# Patient Record
Sex: MALE | Race: OTHER | NOT HISPANIC OR LATINO | URBAN - METROPOLITAN AREA
[De-identification: names, ages, dates, MRNs, and addresses within clinical notes are randomized per-mention and may not be internally consistent; named-entity substitution may affect disease eponyms.]

---

## 2017-04-12 ENCOUNTER — EMERGENCY (EMERGENCY)
Facility: HOSPITAL | Age: 37
LOS: 1 days | Discharge: HOME | End: 2017-04-12
Attending: EMERGENCY MEDICINE | Admitting: EMERGENCY MEDICINE
Payer: SELF-PAY

## 2017-04-12 VITALS
TEMPERATURE: 98 F | OXYGEN SATURATION: 99 % | RESPIRATION RATE: 18 BRPM | DIASTOLIC BLOOD PRESSURE: 103 MMHG | SYSTOLIC BLOOD PRESSURE: 150 MMHG | HEART RATE: 77 BPM

## 2017-04-12 DIAGNOSIS — H57.8 OTHER SPECIFIED DISORDERS OF EYE AND ADNEXA: ICD-10-CM

## 2017-04-12 DIAGNOSIS — B97.89 OTHER VIRAL AGENTS AS THE CAUSE OF DISEASES CLASSIFIED ELSEWHERE: ICD-10-CM

## 2017-04-12 DIAGNOSIS — H10.32 UNSPECIFIED ACUTE CONJUNCTIVITIS, LEFT EYE: ICD-10-CM

## 2017-04-12 DIAGNOSIS — J06.9 ACUTE UPPER RESPIRATORY INFECTION, UNSPECIFIED: ICD-10-CM

## 2017-04-12 PROCEDURE — 99283 EMERGENCY DEPT VISIT LOW MDM: CPT

## 2017-04-12 RX ORDER — ERYTHROMYCIN BASE 5 MG/GRAM
1 OINTMENT (GRAM) OPHTHALMIC (EYE) ONCE
Qty: 0 | Refills: 0 | Status: DISCONTINUED | OUTPATIENT
Start: 2017-04-12 | End: 2017-04-16

## 2017-04-12 NOTE — ED PROVIDER NOTE - CARE PLAN
Principal Discharge DX:	Acute conjunctivitis of left eye, unspecified acute conjunctivitis type Principal Discharge DX:	Acute conjunctivitis of left eye, unspecified acute conjunctivitis type  Secondary Diagnosis:	Viral URI

## 2017-04-12 NOTE — ED PROVIDER NOTE - ENMT NEGATIVE STATEMENT, MLM
Ears: no ear pain and no hearing problems.Nose: nasal congestion and no nasal drainage.Mouth/Throat: no dysphagia, no hoarseness and no throat pain.Neck: no lumps, no pain, no stiffness and no swollen glands.

## 2017-04-12 NOTE — ED PROVIDER NOTE - ATTENDING CONTRIBUTION TO CARE
I, Dr. Lenny Art, interviewed the patient and performed a physical exam and spoke to the resident about the plan of care for this patient.  Pt with left eye reddness, itchiness, assoc with cold sxs.  Appears well, nontoxic, no drainage currently, EOMI.  likely viral conj, cannot exclude bacterial, claritin OTC and anibx ointment.

## 2017-04-12 NOTE — ED ADULT NURSE NOTE - OBJECTIVE STATEMENT
36y m pt c/o burning, itching, tearing, redness to 36y m pt c/o burning, itching, tearing, redness to left eye x 3 days; pt denies vision changes; denies head pain; no fever/chills; aox3; no resp distress; no chest pain; no numbness/tingling

## 2017-04-12 NOTE — ED PROVIDER NOTE - MEDICAL DECISION MAKING DETAILS
37 YO M p/w L eye conjunctivits, no visual changes, well appearing, most likely from viral source, no FB or corneal abrasions on exam, will give abx ointment, educated on lubricating eye drop, optho f/u, discharge.
